# Patient Record
Sex: FEMALE | Race: WHITE
[De-identification: names, ages, dates, MRNs, and addresses within clinical notes are randomized per-mention and may not be internally consistent; named-entity substitution may affect disease eponyms.]

---

## 2019-03-26 ENCOUNTER — HOSPITAL ENCOUNTER (OUTPATIENT)
Dept: HOSPITAL 53 - M SMT | Age: 59
End: 2019-03-26
Attending: INTERNAL MEDICINE

## 2019-03-26 DIAGNOSIS — M51.37: Primary | ICD-10-CM

## 2019-03-26 NOTE — REP
Clinical:  Pain with history of right ankle surgery.

 

Technique:  AP, lateral, bilateral oblique views of the right ankle.

 

Findings:

Moderate to advanced generalized arthritic and post traumatic degenerative

changes are appreciated including lateral narrowing to the tibiotalar joint

space, generalized osteodystrophy, scattered cortical irregularities and

spurring/osteophyte formation as well as generalized subchondral sclerosis and

heterogeneity to the osseous structures.  Lateral view demonstrates pes planus.

 

Impression:

Moderate to advanced generalized arthritic and post traumatic degenerative

changes.

 

 

Electronically Signed by

Volodymyr Powers MD 03/26/2019 03:20 P

## 2019-03-26 NOTE — REP
PARTIAL LUMBAR SPINE, THREE VIEWS:

 

HISTORY:  Degenerative disc disease.

 

There is no acute fracture. The L4-5 and L5-S1 intervertebral discs are decreased

in height consistent with disc degeneration. Osteophytes are present on T12

through L2. There are 8 mm of grade 1 spondylolisthesis of L5 on S1. There appear

to be L5 pars defects.

 

IMPRESSION:

 

Degenerative change, as described above. CT of the lumbar spine may be helpful

for further evaluation of the spondylolisthesis.

 

 

Electronically Signed by

Blanco Gipson MD 03/26/2019 03:05 P

## 2022-11-22 ENCOUNTER — HOSPITAL ENCOUNTER (OUTPATIENT)
Dept: HOSPITAL 53 - M CARPUL | Age: 62
End: 2022-11-22
Attending: INTERNAL MEDICINE
Payer: COMMERCIAL

## 2022-11-22 DIAGNOSIS — R06.00: Primary | ICD-10-CM

## 2022-11-22 PROCEDURE — 94070 EVALUATION OF WHEEZING: CPT

## 2022-11-22 PROCEDURE — 95070 INHLJ BRNCL CHALLENGE TSTG: CPT

## 2023-03-21 ENCOUNTER — HOSPITAL ENCOUNTER (OUTPATIENT)
Dept: HOSPITAL 53 - M IRPOV | Age: 63
End: 2023-03-21
Attending: RADIOLOGY
Payer: COMMERCIAL

## 2023-03-21 VITALS — WEIGHT: 275.58 LBS | BODY MASS INDEX: 47.05 KG/M2 | HEIGHT: 64 IN

## 2023-03-21 VITALS — SYSTOLIC BLOOD PRESSURE: 130 MMHG | DIASTOLIC BLOOD PRESSURE: 88 MMHG

## 2023-03-21 DIAGNOSIS — K76.89: Primary | ICD-10-CM
